# Patient Record
Sex: FEMALE | Race: WHITE | NOT HISPANIC OR LATINO | URBAN - METROPOLITAN AREA
[De-identification: names, ages, dates, MRNs, and addresses within clinical notes are randomized per-mention and may not be internally consistent; named-entity substitution may affect disease eponyms.]

---

## 2019-10-08 ENCOUNTER — IMPORTED ENCOUNTER (OUTPATIENT)
Dept: URBAN - METROPOLITAN AREA CLINIC 50 | Facility: CLINIC | Age: 64
End: 2019-10-08

## 2019-10-22 ENCOUNTER — IMPORTED ENCOUNTER (OUTPATIENT)
Dept: URBAN - METROPOLITAN AREA CLINIC 50 | Facility: CLINIC | Age: 64
End: 2019-10-22

## 2019-10-22 NOTE — PATIENT DISCUSSION
"""Discussed she qualifies for cataract surgery.  Discussed cataract OD and she denies any trouble ""

## 2019-10-31 ENCOUNTER — IMPORTED ENCOUNTER (OUTPATIENT)
Dept: URBAN - METROPOLITAN AREA CLINIC 50 | Facility: CLINIC | Age: 64
End: 2019-10-31

## 2019-11-07 ENCOUNTER — IMPORTED ENCOUNTER (OUTPATIENT)
Dept: URBAN - METROPOLITAN AREA CLINIC 50 | Facility: CLINIC | Age: 64
End: 2019-11-07

## 2019-12-09 ENCOUNTER — IMPORTED ENCOUNTER (OUTPATIENT)
Dept: URBAN - METROPOLITAN AREA CLINIC 50 | Facility: CLINIC | Age: 64
End: 2019-12-09

## 2019-12-19 ENCOUNTER — IMPORTED ENCOUNTER (OUTPATIENT)
Dept: URBAN - METROPOLITAN AREA CLINIC 50 | Facility: CLINIC | Age: 64
End: 2019-12-19

## 2019-12-19 NOTE — PATIENT DISCUSSION
"""S/P IOL OD: Tecnis ZCB00 22.5 (ORA) +ORA/Femto/Arcs +Omidria. Continue post operative instructions and drops per schedule.  """

## 2019-12-23 ENCOUNTER — IMPORTED ENCOUNTER (OUTPATIENT)
Dept: URBAN - METROPOLITAN AREA CLINIC 50 | Facility: CLINIC | Age: 64
End: 2019-12-23

## 2019-12-31 ENCOUNTER — IMPORTED ENCOUNTER (OUTPATIENT)
Dept: URBAN - METROPOLITAN AREA CLINIC 50 | Facility: CLINIC | Age: 64
End: 2019-12-31

## 2019-12-31 NOTE — PATIENT DISCUSSION
"""S/P IOL OS: Tecnis ZCB00 22 +Femto/Arcs +Omidria. Continue post operative instructions and drops per schedule.  """

## 2020-01-06 ENCOUNTER — IMPORTED ENCOUNTER (OUTPATIENT)
Dept: URBAN - METROPOLITAN AREA CLINIC 50 | Facility: CLINIC | Age: 65
End: 2020-01-06

## 2020-01-27 ENCOUNTER — IMPORTED ENCOUNTER (OUTPATIENT)
Dept: URBAN - METROPOLITAN AREA CLINIC 50 | Facility: CLINIC | Age: 65
End: 2020-01-27

## 2020-05-04 ENCOUNTER — IMPORTED ENCOUNTER (OUTPATIENT)
Dept: URBAN - METROPOLITAN AREA CLINIC 50 | Facility: CLINIC | Age: 65
End: 2020-05-04

## 2021-05-14 ASSESSMENT — TONOMETRY
OS_IOP_MMHG: 12
OD_IOP_MMHG: 13
OS_IOP_MMHG: 11
OS_IOP_MMHG: 12
OD_IOP_MMHG: 12
OS_IOP_MMHG: 14
OD_IOP_MMHG: 12
OS_IOP_MMHG: 12
OS_IOP_MMHG: 21
OD_IOP_MMHG: 9
OD_IOP_MMHG: 12
OS_IOP_MMHG: 12
OD_IOP_MMHG: 12
OD_IOP_MMHG: 10
OD_IOP_MMHG: 13
OD_IOP_MMHG: 11
OD_IOP_MMHG: 13
OD_IOP_MMHG: 11
OD_IOP_MMHG: 14
OS_IOP_MMHG: 20
OS_IOP_MMHG: 12
OS_IOP_MMHG: 13
OD_IOP_MMHG: 14
OS_IOP_MMHG: 11
OS_IOP_MMHG: 13
OS_IOP_MMHG: 13

## 2021-05-14 ASSESSMENT — VISUAL ACUITY
OD_SC: 20/200
OS_OTHER: 20/30. 20/40.
OD_SC: 20/20
OS_CC: J2
OD_BAT: >20/400
OD_OTHER: >20/400.
OS_SC: 20/20
OS_OTHER: >20/400.
OS_OTHER: >20/400. >20/400.
OD_PH: 20/50+1
OD_SC: 20/20
OS_PH: 20/40+2
OD_OTHER: 20/25. 20/30.
OD_SC: 20/400
OD_SC: 20/20
OS_CC: 20/40+2
OS_BAT: >20/400
OS_SC: 20/200
OD_OTHER: >20/400.
OS_SC: 20/25
OD_CC: J2
OD_SC: 20/25
OS_SC: 20/20
OD_BAT: 20/25
OS_SC: 20/400
OD_BAT: >20/400
OS_PH: 20/40-1
OS_BAT: >20/400
OS_BAT: 20/30
OD_SC: 20/200
OS_SC: 20/400

## 2021-05-14 ASSESSMENT — PACHYMETRY
OS_CT_UM: 571
OD_CT_UM: 571
OS_CT_UM: 571
OD_CT_UM: 571
OS_CT_UM: 571
OS_CT_UM: 571
OD_CT_UM: 571
OS_CT_UM: 571